# Patient Record
(demographics unavailable — no encounter records)

---

## 2025-05-29 NOTE — PHYSICAL EXAM
[de-identified] : left knee full active extension and medial joint tenderness neg Jermaine and Lc Right knee medial joint tenderness no PF or lateral joint tenderness, Positve Jermaine and Lc  [de-identified] : 4 views both knees show KL3+ medial varus Oa left knee and KL2 change only in right knee.

## 2025-05-29 NOTE — DISCUSSION/SUMMARY
[de-identified] : 60 year old overweight female with bilateral OA left worse than right and left is > one decade post partial medial menisectomy. KL3 left KL2 right Right knee may also have a Baker's cyst with degenerative medial meniscus tear vs new medial root tear.  Joint narrowing not significant this side so will get MRI to rule out meniscal tear vs Baker's cyst vs other Popliteal mass.  Plan: 1.Will schedule for PRP in June as she considers between PRP and HA options.           2. MRI right knee to determine if there is considerable or only mild chondral change and evaluate medial meniscus.  We had a long discussion today about the risks and benefits of various Orthobiologic injection procedures. Mostly discussed PRP.  The fact that these treatments are investigational and not approved by any insurance product was also discussed and patient will consider all and tell us whether she wants further HA injections or new PRP.

## 2025-05-30 NOTE — ASSESSMENT
[FreeTextEntry1] : Neoplasm of uncertain behavior x2 - education, counseling - to better ascertain diagnosis, shave biopsy was completed as below: Shave Biopsy: Location: A. R upper chest- r/o IDN vs skin tag irritated B. R inframammary-  r/o IDN vs skin tag irritated  The indication, benefits, alternatives, and all side effects including pain, bleeding, infection, scar, recurrence, nerve damage were discussed. Informed consent was obtained in writing. The lesions on the above locations were prepped with alcohol and locally anesthetized with 0.5mL of 1% lidocaine with epinephrine. The specimen was removed by tangential shave using a Dermablade. Hemostasis was achieved with pressure and aluminum chloride/drysol. A sterile dressing with Petrolatum ointment was applied to the wound. Verbal and written wound care instructions were given. The specimens were labeled and submitted to pathology for histological evaluation. The patient will be notified of their biopsy results within the next two weeks and appropriate treatment and follow-up recommendations will be made at that time. The procedure was well tolerated without complications.   .  #Dermatitis, scalp chronic, flare - start mometasone solution 1-2x daily to AA on scalp x 2 weeks on, 1 week off, SED, do not get on face  #SKs #Lentgines - education, counseling, reassurance - rtc if growing/changing

## 2025-05-30 NOTE — PHYSICAL EXAM
[FreeTextEntry3] : General: well appearing person in nad, alert, pleasant Focused Skin Exam per patient preference: posterior scalp with minimal erythema, no scale Trunk with scattered stuck on waxy papules. dermoscopy with benign features R upper chest and R inframammary with brown irritated papules

## 2025-05-30 NOTE — HISTORY OF PRESENT ILLNESS
[FreeTextEntry1] : NPV- spots [de-identified] : May 30 2025  1:00PM   60 year F new patient here for evaluation of spots on trunk, spreading. Also irritated moles. Gets itch behind scalp   No personal hx of skin cancer